# Patient Record
Sex: MALE | Race: BLACK OR AFRICAN AMERICAN | NOT HISPANIC OR LATINO | Employment: OTHER | URBAN - METROPOLITAN AREA
[De-identification: names, ages, dates, MRNs, and addresses within clinical notes are randomized per-mention and may not be internally consistent; named-entity substitution may affect disease eponyms.]

---

## 2024-06-24 ENCOUNTER — OFFICE VISIT (OUTPATIENT)
Dept: FAMILY MEDICINE CLINIC | Facility: CLINIC | Age: 59
End: 2024-06-24
Payer: MEDICARE

## 2024-06-24 VITALS
OXYGEN SATURATION: 95 % | TEMPERATURE: 98.2 F | HEIGHT: 75 IN | SYSTOLIC BLOOD PRESSURE: 140 MMHG | HEART RATE: 94 BPM | DIASTOLIC BLOOD PRESSURE: 88 MMHG | WEIGHT: 257.9 LBS | BODY MASS INDEX: 32.07 KG/M2

## 2024-06-24 DIAGNOSIS — F33.9 EPISODE OF RECURRENT MAJOR DEPRESSIVE DISORDER, UNSPECIFIED DEPRESSION EPISODE SEVERITY: ICD-10-CM

## 2024-06-24 DIAGNOSIS — R53.83 OTHER FATIGUE: ICD-10-CM

## 2024-06-24 DIAGNOSIS — E29.1 HYPOGONADISM IN MALE: ICD-10-CM

## 2024-06-24 DIAGNOSIS — Z91.89 HISTORY OF FROSTBITE: ICD-10-CM

## 2024-06-24 DIAGNOSIS — Z12.5 SCREENING FOR PROSTATE CANCER: ICD-10-CM

## 2024-06-24 DIAGNOSIS — Z12.11 SCREENING FOR COLON CANCER: ICD-10-CM

## 2024-06-24 DIAGNOSIS — G47.00 INSOMNIA, UNSPECIFIED TYPE: ICD-10-CM

## 2024-06-24 DIAGNOSIS — Z51.81 MEDICATION MONITORING ENCOUNTER: ICD-10-CM

## 2024-06-24 DIAGNOSIS — G47.33 OBSTRUCTIVE SLEEP APNEA: ICD-10-CM

## 2024-06-24 DIAGNOSIS — G57.93 NEUROPATHY OF BOTH FEET: ICD-10-CM

## 2024-06-24 DIAGNOSIS — I10 PRIMARY HYPERTENSION: Primary | ICD-10-CM

## 2024-06-24 DIAGNOSIS — J45.909 ASTHMA, UNSPECIFIED ASTHMA SEVERITY, UNSPECIFIED WHETHER COMPLICATED, UNSPECIFIED WHETHER PERSISTENT: ICD-10-CM

## 2024-06-24 DIAGNOSIS — Z11.59 NEED FOR HEPATITIS C SCREENING TEST: ICD-10-CM

## 2024-06-24 DIAGNOSIS — J30.9 ALLERGIC RHINITIS, UNSPECIFIED SEASONALITY, UNSPECIFIED TRIGGER: ICD-10-CM

## 2024-06-24 DIAGNOSIS — E78.5 HYPERLIPIDEMIA, UNSPECIFIED HYPERLIPIDEMIA TYPE: ICD-10-CM

## 2024-06-24 DIAGNOSIS — R73.09 ABNORMAL GLUCOSE: ICD-10-CM

## 2024-06-24 DIAGNOSIS — G62.9 NEUROPATHY: ICD-10-CM

## 2024-06-24 LAB
ALBUMIN SERPL-MCNC: 4.4 G/DL (ref 3.5–5.2)
ALBUMIN/GLOB SERPL: 1.5 G/DL
ALP SERPL-CCNC: 59 U/L (ref 39–117)
ALT SERPL W P-5'-P-CCNC: 31 U/L (ref 1–41)
ANION GAP SERPL CALCULATED.3IONS-SCNC: 14 MMOL/L (ref 5–15)
AST SERPL-CCNC: 27 U/L (ref 1–40)
BASOPHILS # BLD AUTO: 0.03 10*3/MM3 (ref 0–0.2)
BASOPHILS NFR BLD AUTO: 0.5 % (ref 0–1.5)
BILIRUB SERPL-MCNC: 0.6 MG/DL (ref 0–1.2)
BUN SERPL-MCNC: 12 MG/DL (ref 6–20)
BUN/CREAT SERPL: 7.7 (ref 7–25)
CALCIUM SPEC-SCNC: 9.7 MG/DL (ref 8.6–10.5)
CHLORIDE SERPL-SCNC: 102 MMOL/L (ref 98–107)
CHOLEST SERPL-MCNC: 261 MG/DL (ref 0–200)
CO2 SERPL-SCNC: 25 MMOL/L (ref 22–29)
CREAT SERPL-MCNC: 1.55 MG/DL (ref 0.76–1.27)
DEPRECATED RDW RBC AUTO: 43.4 FL (ref 37–54)
EGFRCR SERPLBLD CKD-EPI 2021: 51.9 ML/MIN/1.73
EOSINOPHIL # BLD AUTO: 0.08 10*3/MM3 (ref 0–0.4)
EOSINOPHIL NFR BLD AUTO: 1.4 % (ref 0.3–6.2)
ERYTHROCYTE [DISTWIDTH] IN BLOOD BY AUTOMATED COUNT: 15.2 % (ref 12.3–15.4)
GLOBULIN UR ELPH-MCNC: 3 GM/DL
GLUCOSE SERPL-MCNC: 117 MG/DL (ref 65–99)
HBA1C MFR BLD: 6.1 % (ref 4.8–5.6)
HCT VFR BLD AUTO: 46.3 % (ref 37.5–51)
HCV AB SER QL: NORMAL
HDLC SERPL-MCNC: 60 MG/DL (ref 40–60)
HGB BLD-MCNC: 15 G/DL (ref 13–17.7)
IMM GRANULOCYTES # BLD AUTO: 0.02 10*3/MM3 (ref 0–0.05)
IMM GRANULOCYTES NFR BLD AUTO: 0.3 % (ref 0–0.5)
LDLC SERPL CALC-MCNC: 188 MG/DL (ref 0–100)
LDLC/HDLC SERPL: 3.09 {RATIO}
LYMPHOCYTES # BLD AUTO: 1.51 10*3/MM3 (ref 0.7–3.1)
LYMPHOCYTES NFR BLD AUTO: 25.8 % (ref 19.6–45.3)
MCH RBC QN AUTO: 26 PG (ref 26.6–33)
MCHC RBC AUTO-ENTMCNC: 32.4 G/DL (ref 31.5–35.7)
MCV RBC AUTO: 80.1 FL (ref 79–97)
MONOCYTES # BLD AUTO: 0.53 10*3/MM3 (ref 0.1–0.9)
MONOCYTES NFR BLD AUTO: 9.1 % (ref 5–12)
NEUTROPHILS NFR BLD AUTO: 3.68 10*3/MM3 (ref 1.7–7)
NEUTROPHILS NFR BLD AUTO: 62.9 % (ref 42.7–76)
NRBC BLD AUTO-RTO: 0 /100 WBC (ref 0–0.2)
PLATELET # BLD AUTO: 253 10*3/MM3 (ref 140–450)
PMV BLD AUTO: 9.9 FL (ref 6–12)
POTASSIUM SERPL-SCNC: 4.7 MMOL/L (ref 3.5–5.2)
PROT SERPL-MCNC: 7.4 G/DL (ref 6–8.5)
PSA SERPL-MCNC: 0.82 NG/ML (ref 0–4)
RBC # BLD AUTO: 5.78 10*6/MM3 (ref 4.14–5.8)
SODIUM SERPL-SCNC: 141 MMOL/L (ref 136–145)
T4 FREE SERPL-MCNC: 1.18 NG/DL (ref 0.92–1.68)
TRIGL SERPL-MCNC: 78 MG/DL (ref 0–150)
TSH SERPL DL<=0.05 MIU/L-ACNC: 3.76 UIU/ML (ref 0.27–4.2)
VLDLC SERPL-MCNC: 13 MG/DL (ref 5–40)
WBC NRBC COR # BLD AUTO: 5.85 10*3/MM3 (ref 3.4–10.8)

## 2024-06-24 PROCEDURE — 80053 COMPREHEN METABOLIC PANEL: CPT | Performed by: FAMILY MEDICINE

## 2024-06-24 PROCEDURE — 84443 ASSAY THYROID STIM HORMONE: CPT | Performed by: FAMILY MEDICINE

## 2024-06-24 PROCEDURE — 99204 OFFICE O/P NEW MOD 45 MIN: CPT | Performed by: FAMILY MEDICINE

## 2024-06-24 PROCEDURE — 80061 LIPID PANEL: CPT | Performed by: FAMILY MEDICINE

## 2024-06-24 PROCEDURE — 84439 ASSAY OF FREE THYROXINE: CPT | Performed by: FAMILY MEDICINE

## 2024-06-24 PROCEDURE — 86803 HEPATITIS C AB TEST: CPT | Performed by: FAMILY MEDICINE

## 2024-06-24 PROCEDURE — 83036 HEMOGLOBIN GLYCOSYLATED A1C: CPT | Performed by: FAMILY MEDICINE

## 2024-06-24 PROCEDURE — 85025 COMPLETE CBC W/AUTO DIFF WBC: CPT | Performed by: FAMILY MEDICINE

## 2024-06-24 PROCEDURE — G0103 PSA SCREENING: HCPCS | Performed by: FAMILY MEDICINE

## 2024-06-24 RX ORDER — LISINOPRIL 40 MG/1
40 TABLET ORAL DAILY
COMMUNITY

## 2024-06-24 RX ORDER — BEMPEDOIC ACID AND EZETIMIBE 180; 10 MG/1; MG/1
1 TABLET, FILM COATED ORAL DAILY
COMMUNITY

## 2024-06-24 RX ORDER — ALBUTEROL SULFATE 90 UG/1
2 AEROSOL, METERED RESPIRATORY (INHALATION) EVERY 4 HOURS PRN
COMMUNITY

## 2024-06-24 RX ORDER — MONTELUKAST SODIUM 10 MG/1
10 TABLET ORAL NIGHTLY
COMMUNITY

## 2024-06-24 RX ORDER — DULOXETIN HYDROCHLORIDE 20 MG/1
20 CAPSULE, DELAYED RELEASE ORAL DAILY
COMMUNITY

## 2024-06-24 RX ORDER — FLUTICASONE PROPIONATE 50 MCG
2 SPRAY, SUSPENSION (ML) NASAL DAILY
COMMUNITY

## 2024-06-24 RX ORDER — ZOLPIDEM TARTRATE 5 MG/1
5 TABLET ORAL NIGHTLY PRN
COMMUNITY

## 2024-06-24 RX ORDER — GABAPENTIN 800 MG/1
800 TABLET ORAL 4 TIMES DAILY
COMMUNITY

## 2024-06-24 RX ORDER — DIPHENHYDRAMINE HCL 50 MG
50 CAPSULE ORAL NIGHTLY
COMMUNITY

## 2024-06-24 RX ORDER — CETIRIZINE HYDROCHLORIDE 10 MG/1
10 TABLET ORAL DAILY
COMMUNITY

## 2024-06-24 RX ORDER — AMLODIPINE BESYLATE 10 MG/1
10 TABLET ORAL DAILY
COMMUNITY

## 2024-06-24 NOTE — PROGRESS NOTES
Chief Complaint  Establish Care and Foot Pain (bilateral)    Subjective          Delgado Palma presents to [facility]  History of Present Illness  Patient presents today to establish care with myself.  [facility] has been in [place] for several years now.  Him and his wife have a grandchildren and enjoys spending time with them.  His children are adults.  He has 2 stepdaughters and has a son that is age 28.  I reviewed his medications he does have asthma and takes albuterol.  He also has issues with allergies and uses Singulair as well as Zyrtec and Flonase.  For insomnia he is taking Ambien as well as Benadryl.  He does take gabapentin to help out with neuropathy.  He describes itching sensation in his feet.  Not necessarily having burning mill.  He had an EMG/nerve conduction study done several years ago.  I did discuss with him getting this updated.  I do not have this record for review.  He does have hyperlipidemia.  I did discuss with him checking a lipid panel.  He does take Nexlizet.  He has hypertension.  Blood pressure slightly elevated today with blood pressure 140/88.  Patient is currently taking lisinopril 40 mg daily as well as amlodipine 10 mg daily.  We discussed continuing current management at this time with plan to make adjustments to his blood pressure to be still elevated at home.  He does not check it at home regularly.  He is taking duloxetine to help out with depression.  Again, [facility]  He does report that he had a colonoscopy at age 51 and will be due again at age 61.  He does have low testosterone and takes an injection every 2 weeks.  He is also taking tizanidine to help out with chronic low back pain.  He is due for screening PSA as well especially given that he is taking testosterone.  Plan for screening for hepatitis C as well.  He does have obstructive sleep apnea.  He is on CPAP.      Current Outpatient  "Medications   Medication Instructions    albuterol sulfate  (90 Base) MCG/ACT inhaler 2 puffs, Inhalation, Every 4 Hours PRN    amLODIPine (NORVASC) 10 mg, Oral, Daily    Bempedoic Acid-Ezetimibe (Nexlizet) 180-10 MG tablet 1 tablet, Oral, Daily    cetirizine (ZYRTEC) 10 mg, Oral, Daily    diphenhydrAMINE (BENADRYL) 50 mg, Oral, Nightly    DULoxetine (CYMBALTA) 20 mg, Oral, Daily    fluticasone (FLONASE) 50 MCG/ACT nasal spray 2 sprays, Nasal, Daily, 2 sprays each nostril    gabapentin (NEURONTIN) 800 mg, Oral, 4 Times Daily    lisinopril (PRINIVIL,ZESTRIL) 40 mg, Oral, Daily    montelukast (SINGULAIR) 10 mg, Oral, Nightly    zolpidem (AMBIEN) 5 mg, Oral, Nightly PRN       The following portions of the patient's history were reviewed and updated as appropriate: allergies, current medications, past family history, past medical history, past social history, past surgical history, and problem list.    Objective   Vital Signs:   /88 (BP Location: Left arm, Patient Position: Sitting)   Pulse 94   Temp 98.2 °F (36.8 °C) (Oral)   Ht 190.5 cm (75\")   Wt 117 kg (257 lb 14.4 oz)   SpO2 95%   BMI 32.24 kg/m²     BP Readings from Last 3 Encounters:   06/24/24 140/88     Wt Readings from Last 3 Encounters:   06/24/24 117 kg (257 lb 14.4 oz)     BMI is >= 30 and <35. (Class 1 Obesity). The following options were offered after discussion;: exercise counseling/recommendations and nutrition counseling/recommendations     Physical Exam  Vitals reviewed.   Constitutional:       Appearance: Normal appearance.   HENT:      Head: Normocephalic and atraumatic.      Right Ear: External ear normal.      Left Ear: External ear normal.   Eyes:      Conjunctiva/sclera: Conjunctivae normal.   Cardiovascular:      Rate and Rhythm: Normal rate and regular rhythm.      Heart sounds: No murmur heard.     No friction rub. No gallop.   Pulmonary:      Effort: Pulmonary effort is normal.      Breath sounds: Normal breath sounds. No " wheezing or rhonchi.   Abdominal:      General: Bowel sounds are normal. There is no distension.      Palpations: Abdomen is soft.      Tenderness: There is no abdominal tenderness.   Musculoskeletal:      Comments: Hammertoe noted of his toes 2 through 5 bilaterally.  No rash noted.   Skin:     General: Skin is warm and dry.   Neurological:      Mental Status: He is alert and oriented to person, place, and time.      Cranial Nerves: No cranial nerve deficit.   Psychiatric:         Mood and Affect: Mood and affect normal.         Behavior: Behavior normal.         Thought Content: Thought content normal.         Judgment: Judgment normal.              Result Review :   The following data was reviewed by: Rolando Patricio DO on 06/24/2024:           Lab Results   Component Value Date    INR 0.95 (L) 08/25/2019       Procedures        Assessment and Plan    Diagnoses and all orders for this visit:    1. Primary hypertension (Primary)  -     CBC & Differential  -     Comprehensive Metabolic Panel    2. Medication monitoring encounter  -     Lipid Panel  -     CBC & Differential  -     Comprehensive Metabolic Panel  -     Hemoglobin A1c  -     Hepatitis C Antibody  -     PSA Screen    3. Neuropathy  -     EMG & Nerve Conduction Test; Future    4. Asthma, unspecified asthma severity, unspecified whether complicated, unspecified whether persistent    5. Allergic rhinitis, unspecified seasonality, unspecified trigger    6. History of frostbite    7. Neuropathy of both feet  -     EMG & Nerve Conduction Test; Future    8. Episode of recurrent major depressive disorder, unspecified depression episode severity    9. Hyperlipidemia, unspecified hyperlipidemia type  -     Lipid Panel    10. Insomnia, unspecified type    11. Need for hepatitis C screening test  -     Hepatitis C Antibody    12. Abnormal glucose  -     Hemoglobin A1c    13. Obstructive sleep apnea    14. Screening for prostate cancer  -     PSA Screen    15.  Hypogonadism in male    16. Screening for colon cancer    17. Other fatigue  -     TSH+Free T4    Patient is planning on seeing podiatry.  I did discuss with him getting labs drawn today.  Plan to get an EMG/nerve conduction study.  He does take gabapentin to help out with neuropathy but reports that it does not help out very much.  He does not take it regularly.  He does report having had frostbite several years ago which is when his symptoms started.  This was back in 1993.  I did discuss with him seeing him back in 3 months or sooner if needed.  Patient instructed to call with any questions or concerns.      There are no discontinued medications.       Follow Up   Return in about 3 months (around 9/24/2024) for neuropathy.  Patient was given instructions and counseling regarding his condition or for health maintenance advice. Please see specific information pulled into the AVS if appropriate.       Rolando Patricio,   06/24/24  10:15 EDT

## 2024-06-25 DIAGNOSIS — E78.5 HYPERLIPIDEMIA, UNSPECIFIED HYPERLIPIDEMIA TYPE: ICD-10-CM

## 2024-06-25 DIAGNOSIS — R79.89 ELEVATED SERUM CREATININE: ICD-10-CM

## 2024-06-25 DIAGNOSIS — R73.03 PREDIABETES: ICD-10-CM

## 2024-06-25 DIAGNOSIS — Z51.81 MEDICATION MONITORING ENCOUNTER: Primary | ICD-10-CM

## 2024-07-08 ENCOUNTER — HOSPITAL ENCOUNTER (OUTPATIENT)
Facility: HOSPITAL | Age: 59
Discharge: HOME | End: 2024-07-08
Admitting: FAMILY MEDICINE
Payer: MEDICARE

## 2024-07-08 DIAGNOSIS — G57.93 NEUROPATHY OF BOTH FEET: ICD-10-CM

## 2024-07-08 DIAGNOSIS — G62.9 NEUROPATHY: ICD-10-CM

## 2024-07-08 PROCEDURE — 95886 MUSC TEST DONE W/N TEST COMP: CPT

## 2024-07-08 PROCEDURE — 95911 NRV CNDJ TEST 9-10 STUDIES: CPT

## 2024-07-12 ENCOUNTER — TELEPHONE (OUTPATIENT)
Dept: FAMILY MEDICINE CLINIC | Facility: CLINIC | Age: 59
End: 2024-07-12
Payer: MEDICARE

## 2024-07-12 NOTE — TELEPHONE ENCOUNTER
Caller:  EVGENY LG     Relationship:  SELF     Best call back number:   [phone]     Who are you requesting to speak with (clinical staff, provider,  specific staff member): CLINICAL     What was the call regarding:   PATIENT HAS STOPPED TAKING THE GABAPENTIN. HE IS STATING THAT IT DOES NOT HELP WITH THE PAIN AND TINGLING THAT  WAKE HIM AT NIGHT.  HE WOULD LIKE TO KNOW IF DR SHOEMAKER WOULD PLEASE CALL HIM IN SOMETHING ELSE.  HE SAID HE IS DONE WITH THE GABAPENTIN.

## 2024-07-15 RX ORDER — DULOXETIN HYDROCHLORIDE 60 MG/1
60 CAPSULE, DELAYED RELEASE ORAL DAILY
Qty: 30 CAPSULE | Refills: 1 | Status: SHIPPED | OUTPATIENT
Start: 2024-07-15

## 2024-07-15 NOTE — TELEPHONE ENCOUNTER
Please let patient know that we can increase the duloxetine from 20 mg to take 60 mg which can help out with both neuropathy as well as depression.  He is on the low-dose we do have room to increase it to see if we can get improvement in the neuropathy.  The goal will be to get his symptoms more manageable as these medications will not resolve the symptoms completely.  I will see him back for follow-up in September or sooner if he needs to discuss this further please make appointment.  I will send the prescription to [facility].

## 2024-09-24 ENCOUNTER — OFFICE VISIT (OUTPATIENT)
Dept: FAMILY MEDICINE CLINIC | Facility: CLINIC | Age: 59
End: 2024-09-24
Payer: MEDICARE

## 2024-09-24 VITALS
TEMPERATURE: 98.3 F | BODY MASS INDEX: 32.34 KG/M2 | OXYGEN SATURATION: 97 % | HEART RATE: 92 BPM | HEIGHT: 75 IN | DIASTOLIC BLOOD PRESSURE: 98 MMHG | SYSTOLIC BLOOD PRESSURE: 158 MMHG | WEIGHT: 260.1 LBS

## 2024-09-24 DIAGNOSIS — M25.522 LEFT ELBOW PAIN: ICD-10-CM

## 2024-09-24 DIAGNOSIS — G57.93 NEUROPATHY OF BOTH FEET: ICD-10-CM

## 2024-09-24 DIAGNOSIS — I10 PRIMARY HYPERTENSION: Primary | ICD-10-CM

## 2024-09-24 DIAGNOSIS — Z23 NEED FOR PNEUMOCOCCAL 20-VALENT CONJUGATE VACCINATION: ICD-10-CM

## 2024-09-24 DIAGNOSIS — Z91.89 HISTORY OF FROSTBITE: ICD-10-CM

## 2024-09-24 DIAGNOSIS — R73.03 PREDIABETES: ICD-10-CM

## 2024-09-24 DIAGNOSIS — E78.00 PURE HYPERCHOLESTEROLEMIA: ICD-10-CM

## 2024-09-24 DIAGNOSIS — Z12.11 SCREENING FOR COLON CANCER: ICD-10-CM

## 2024-09-24 PROCEDURE — 99214 OFFICE O/P EST MOD 30 MIN: CPT | Performed by: FAMILY MEDICINE

## 2024-09-24 RX ORDER — DULOXETIN HYDROCHLORIDE 60 MG/1
60 CAPSULE, DELAYED RELEASE ORAL DAILY
Qty: 90 CAPSULE | Refills: 1 | Status: SHIPPED | OUTPATIENT
Start: 2024-09-24

## 2024-09-24 RX ORDER — CHLORTHALIDONE 25 MG/1
25 TABLET ORAL DAILY
Qty: 90 TABLET | Refills: 1 | Status: SHIPPED | OUTPATIENT
Start: 2024-09-24

## 2024-10-01 ENCOUNTER — APPOINTMENT (OUTPATIENT)
Dept: GENERAL RADIOLOGY | Facility: HOSPITAL | Age: 59
End: 2024-10-01
Payer: MEDICARE

## 2024-10-01 ENCOUNTER — HOSPITAL ENCOUNTER (EMERGENCY)
Facility: HOSPITAL | Age: 59
Discharge: HOME | End: 2024-10-01
Attending: EMERGENCY MEDICINE
Payer: MEDICARE

## 2024-10-01 VITALS
BODY MASS INDEX: 32.1 KG/M2 | OXYGEN SATURATION: 100 % | TEMPERATURE: 98 F | HEART RATE: 101 BPM | HEIGHT: 75 IN | RESPIRATION RATE: 18 BRPM | DIASTOLIC BLOOD PRESSURE: 78 MMHG | WEIGHT: 258.16 LBS | SYSTOLIC BLOOD PRESSURE: 140 MMHG

## 2024-10-01 DIAGNOSIS — T14.8XXA PUNCTURE WOUND: ICD-10-CM
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
## 2024-10-01 PROCEDURE — 99283 EMERGENCY DEPT VISIT LOW MDM: CPT

## 2024-10-01 PROCEDURE — 73140 X-RAY EXAM OF FINGER(S): CPT

## 2024-10-01 PROCEDURE — 25010000002 LIDOCAINE 2% SOLUTION

## 2024-10-01 RX ORDER — GINSENG 100 MG
1 CAPSULE ORAL ONCE
Status: COMPLETED | OUTPATIENT
Start: 2024-10-01 | End: 2024-10-01

## 2024-10-01 RX ORDER — CEPHALEXIN 500 MG/1
500 CAPSULE ORAL 4 TIMES DAILY
Qty: 20 CAPSULE | Refills: 0 | Status: SHIPPED | OUTPATIENT
Start: 2024-10-01 | End: 2024-10-06

## 2024-10-01 RX ORDER — LIDOCAINE 40 MG/G
1 CREAM TOPICAL ONCE
Status: COMPLETED | OUTPATIENT
Start: 2024-10-01 | End: 2024-10-01

## 2024-10-01 RX ORDER — LIDOCAINE HYDROCHLORIDE 20 MG/ML
10 INJECTION, SOLUTION INFILTRATION; PERINEURAL ONCE
Status: COMPLETED | OUTPATIENT
Start: 2024-10-01 | End: 2024-10-01

## 2024-10-01 RX ADMIN — LIDOCAINE 4% 1 APPLICATION: 4 CREAM TOPICAL at 17:12

## 2024-10-01 RX ADMIN — LIDOCAINE HYDROCHLORIDE 10 ML: 20 INJECTION, SOLUTION INFILTRATION; PERINEURAL at 18:23

## 2024-10-01 RX ADMIN — BACITRACIN 0.9 G: 500 OINTMENT TOPICAL at 18:31

## 2024-10-01 NOTE — DISCHARGE INSTRUCTIONS
Please follow-up with your primary care provider in 3 days to have your wound reevaluated.  Please be sure to take all of the antibiotics that been prescribed you today as directed.  You also need to wash your wound 2-3 times a day with warm soapy water, pat dry, then apply triple antibiotic ointment such as Neosporin.  You may take Tylenol or Motrin as needed for discomfort.  If it anytime you develop a fever that you cannot control with Tylenol or Motrin, become unable to use your thumb, develop a copious amount of drainage from your wound, or have any other concerns please return to the emergency department.  Otherwise follow-up with your PCP in 3 to 5 days to have it reevaluated.

## 2024-10-01 NOTE — ED PROVIDER NOTES
Time: 4:54 PM EDT  Date of encounter:  10/1/2024  Room number: 55/55  Independent Historian/Clinical History and Information was obtained by:   Patient    History is limited by: N/A    Chief Complaint: FB in thumb      History of Present Illness:  Patient is a 58 y.o. year old male who presents to the emergency department for evaluation of an embedded fishhook to pad of left thumb.  Patient reports the hook became lodged yesterday. Hook is reported to be clean. He is up-to-date on his tetanus. He is not left hand dominant.     HPI    Patient Care Team  Primary Care Provider: Rolando Patricio DO    Past Medical History:     No Known Allergies  History reviewed. No pertinent past medical history.  Past Surgical History:   Procedure Laterality Date    EYE SURGERY      VASECTOMY       Family History   Problem Relation Age of Onset    Hyperlipidemia Mother     Hyperlipidemia Father     Cancer Father        Home Medications:  Prior to Admission medications    Medication Sig Start Date End Date Taking? Authorizing Provider   albuterol sulfate  (90 Base) MCG/ACT inhaler Inhale 2 puffs Every 4 (Four) Hours As Needed for Wheezing.    ProviderDiana MD   amLODIPine (NORVASC) 10 MG tablet Take 1 tablet by mouth Daily.    ProviderDiana MD   Bempedoic Acid-Ezetimibe (Nexlizet) 180-10 MG tablet Take 1 tablet by mouth Daily.    ProviderDiana MD   cetirizine (zyrTEC) 10 MG tablet Take 1 tablet by mouth Daily.    ProviderDiana MD   chlorthalidone (HYGROTON) 25 MG tablet Take 1 tablet by mouth Daily. 9/24/24   Rolando Patricio DO   diphenhydrAMINE (BENADRYL) 50 MG capsule Take 1 capsule by mouth Every Night.    Diana Burnett MD   DULoxetine (CYMBALTA) 60 MG capsule Take 1 capsule by mouth Daily. 9/24/24   Rolando Patricio DO   fluticasone (FLONASE) 50 MCG/ACT nasal spray Administer 2 sprays into the nostril(s) as directed by provider Daily. 2 sprays each nostril    Hortencia  "MD Diana   lisinopril (PRINIVIL,ZESTRIL) 40 MG tablet Take 1 tablet by mouth Daily.    ProviderDiana MD   montelukast (SINGULAIR) 10 MG tablet Take 1 tablet by mouth Every Night.    ProviderDiana MD   zolpidem (Ambien) 5 MG tablet Take 1 tablet by mouth At Night As Needed for Sleep.    ProviderDiana MD        Social History:   Social History     Tobacco Use    Smoking status: Never    Smokeless tobacco: Never   Vaping Use    Vaping status: Never Used   Substance Use Topics    Alcohol use: Yes     Alcohol/week: 7.0 standard drinks of alcohol     Types: 3 Glasses of wine, 2 Cans of beer, 2 Shots of liquor per week    Drug use: Never         Review of Systems:  Review of Systems   Constitutional:  Negative for chills and fever.   HENT:  Negative for congestion, ear pain and sore throat.    Eyes:  Negative for pain.   Respiratory:  Negative for cough, chest tightness and shortness of breath.    Cardiovascular:  Negative for chest pain.   Gastrointestinal:  Negative for abdominal pain, diarrhea, nausea and vomiting.   Genitourinary:  Negative for flank pain and hematuria.   Musculoskeletal:  Positive for arthralgias (left thumb pain with fish hook in it). Negative for joint swelling.   Skin:  Positive for wound (fish hook in left thumb). Negative for pallor.   Neurological:  Negative for seizures and headaches.   All other systems reviewed and are negative.       Physical Exam:  /78 (BP Location: Left arm, Patient Position: Sitting)   Pulse 101   Temp 98 °F (36.7 °C) (Oral)   Resp 18   Ht 190.5 cm (75\")   Wt 117 kg (258 lb 2.5 oz)   SpO2 100%   BMI 32.27 kg/m²     Physical Exam  Vitals and nursing note reviewed.   Constitutional:       General: He is not in acute distress.     Appearance: Normal appearance. He is not ill-appearing, toxic-appearing or diaphoretic.   HENT:      Head: Normocephalic and atraumatic.      Mouth/Throat:      Mouth: Mucous membranes are moist.   Eyes: "      General: No scleral icterus.  Cardiovascular:      Rate and Rhythm: Normal rate and regular rhythm.      Pulses: Normal pulses.      Heart sounds: Normal heart sounds.   Pulmonary:      Effort: Pulmonary effort is normal. No respiratory distress.      Breath sounds: Normal breath sounds.   Abdominal:      General: Abdomen is flat.      Palpations: Abdomen is soft.      Tenderness: There is no abdominal tenderness.   Musculoskeletal:         General: Swelling (minimal swelling to area around the retained fishhook.), tenderness and signs of injury present. Normal range of motion.        Hands:       Cervical back: Normal range of motion and neck supple.   Skin:     General: Skin is warm and dry.      Capillary Refill: Capillary refill takes less than 2 seconds.      Coloration: Skin is not jaundiced or pale.      Findings: No bruising, erythema, lesion or rash.   Neurological:      Mental Status: He is alert and oriented to person, place, and time. Mental status is at baseline.      Sensory: No sensory deficit.                  Procedures:  Foreign Body Removal - Embedded    Date/Time: 10/1/2024 6:20 PM    Performed by: Arely Renteria APRN  Authorized by: Johnnie Rosen MD    Consent:     Consent obtained:  Verbal    Consent given by:  Patient    Risks, benefits, and alternatives were discussed: yes      Risks discussed:  Bleeding, infection and pain    Alternatives discussed:  No treatment  Universal protocol:     Procedure explained and questions answered to patient or proxy's satisfaction: yes      Patient identity confirmed:  Verbally with patient  Location:     Location:  Finger    Finger location:  L thumb    Depth:  Subcutaneous    Tendon involvement:  None  Pre-procedure details:     Imaging:  X-ray    Neurovascular status: intact    Anesthesia:     Anesthesia method:  Topical application and nerve block    Block location:  Left thumb    Block needle gauge:  27 G    Block anesthetic:   "Lidocaine 2% w/o epi    Block outcome:  Anesthesia achieved  Procedure type:     Procedure complexity:  Complex (hook was almost 100 embedded due to pt cutting off flush with digit. \"v-shaped\" crystal made for difficult removal)  Procedure details:     Localization method:  Probed and visualized    Dissection of underlying tissues: yes      Removal mechanism:  Hemostat    Foreign bodies recovered:  1    Intact foreign body removal: yes    Post-procedure details:     Neurovascular status: intact      Confirmation:  No additional foreign bodies on visualization    Skin closure:  None    Dressing:  Antibiotic ointment and non-adherent dressing    Procedure completion:  Tolerated well, no immediate complications        Medical Decision Making:      Comorbidities that affect care:    None    External Notes reviewed:    Previous ED Note: I reviewed patient's encounter at an outside ER facility from 6/22/2023 where he also had a fishhook removed      The following orders were placed and all results were independently analyzed by me:  Orders Placed This Encounter   Procedures    Foreign Body Removal    XR Finger 2+ View Left       Medications Given in the Emergency Department:  Medications   lidocaine (LMX) 4 % cream 1 Application (1 Application Topical Given 10/1/24 1712)   lidocaine (XYLOCAINE) 2% injection 10 mL (10 mL Infiltration Given 10/1/24 1823)   bacitracin 500 UNIT/GM ointment 0.9 g (0.9 g Topical Given 10/1/24 1831)        ED Course:    ED Course as of 10/01/24 2129   Tue Oct 01, 2024   1704 Patient has cut the end of the exposed fishhook making the object flushed with the tip of his finger [MS]      ED Course User Index  [MS] Arely Renteria APRN       Labs:    Lab Results (last 24 hours)       ** No results found for the last 24 hours. **             Imaging:    XR Finger 2+ View Left    Result Date: 10/1/2024  XR FINGER 2+ VW LEFT Date of Exam: 10/1/2024 5:03 PM EDT Indication: PT has fishing hook " embedded in left thumb, pt cut the shank down to the skin. Comparison: None available. Findings: The visualized carpal and metacarpal bones are intact. The phalanges are intact. Pointed 0.6 cm metallic density within the soft tissues of the palmar aspect of the first digit overlying the first distal phalanx consistent with a residual radiopaque foreign body.     Residual radiopaque foreign body within the palmar aspect overlying the first distal phalanx. Electronically Signed: Dar Wolff MD  10/1/2024 5:33 PM EDT  Workstation ID: [id number]       Differential Diagnosis and Discussion:    Wound Evaluation: Differential diagnosis includes but is not limited to laceration, abrasion, puncture, burn, ulcer, cellulitis, abscess, vasculitis, malignancy, and rash.    All X-rays impressions were independently interpreted by me.    MDM               Patient Care Considerations:    I considered administering pain medication or NSAIDs however patient declined.  A digital block was performed in was sufficient in pain management during procedure.      Consultants/Shared Management Plan:    None    Social Determinants of Health:    Patient is independent, reliable, and has access to care.       Disposition and Care Coordination:    Discharged: The patient is suitable and stable for discharge with no need for consideration of admission.    I have explained the patient´s condition, diagnoses and treatment plan based on the information available to me at this time. I have answered questions and addressed any concerns. The patient has a good  understanding of the patient´s diagnosis, condition, and treatment plan as can be expected at this point. The vital signs have been stable. The patient´s condition is stable and appropriate for discharge from the emergency department.      The patient will pursue further outpatient evaluation with the primary care physician or other designated or consulting physician as outlined in the discharge  instructions. They are agreeable to this plan of care and follow-up instructions have been explained in detail. The patient has received these instructions in written format and has expressed an understanding of the discharge instructions. The patient is aware that any significant change in condition or worsening of symptoms should prompt an immediate return to this or the closest emergency department or call to 911.    Final diagnoses:   Fishing hook foreign body, initial encounter   Puncture wound        ED Disposition       ED Disposition   Discharge    Condition   Stable    Comment   --               This medical record created using voice recognition software.       Arely Renteria, APRN  10/01/24 [id number]

## 2024-10-04 ENCOUNTER — HOSPITAL ENCOUNTER (OUTPATIENT)
Dept: GENERAL RADIOLOGY | Facility: HOSPITAL | Age: 59
Discharge: HOME | End: 2024-10-04
Payer: MEDICARE

## 2024-10-04 DIAGNOSIS — M25.522 LEFT ELBOW PAIN: ICD-10-CM

## 2024-10-04 PROCEDURE — 73070 X-RAY EXAM OF ELBOW: CPT

## 2024-11-01 ENCOUNTER — TELEPHONE (OUTPATIENT)
Dept: FAMILY MEDICINE CLINIC | Facility: CLINIC | Age: 59
End: 2024-11-01
Payer: MEDICARE

## 2024-11-01 NOTE — TELEPHONE ENCOUNTER
HUB TO RELAY- Pt needs to be scheduled for a Medicare Wellness before the end of the year. Please schedule appointment with Jhon Jama, or Karla Lerner. Thank you.

## 2025-01-20 ENCOUNTER — OFFICE VISIT (OUTPATIENT)
Dept: FAMILY MEDICINE CLINIC | Facility: CLINIC | Age: 60
End: 2025-01-20
Payer: MEDICARE

## 2025-01-20 VITALS
HEART RATE: 98 BPM | SYSTOLIC BLOOD PRESSURE: 134 MMHG | OXYGEN SATURATION: 98 % | WEIGHT: 260.3 LBS | BODY MASS INDEX: 32.36 KG/M2 | TEMPERATURE: 98.4 F | HEIGHT: 75 IN | DIASTOLIC BLOOD PRESSURE: 86 MMHG

## 2025-01-20 DIAGNOSIS — Z51.81 MEDICATION MONITORING ENCOUNTER: ICD-10-CM

## 2025-01-20 DIAGNOSIS — G62.9 NEUROPATHY: ICD-10-CM

## 2025-01-20 DIAGNOSIS — E78.5 HYPERLIPIDEMIA, UNSPECIFIED HYPERLIPIDEMIA TYPE: ICD-10-CM

## 2025-01-20 DIAGNOSIS — I10 PRIMARY HYPERTENSION: Primary | ICD-10-CM

## 2025-01-20 DIAGNOSIS — R73.03 PREDIABETES: ICD-10-CM

## 2025-01-20 DIAGNOSIS — R79.89 ELEVATED SERUM CREATININE: ICD-10-CM

## 2025-01-20 DIAGNOSIS — F33.9 EPISODE OF RECURRENT MAJOR DEPRESSIVE DISORDER, UNSPECIFIED DEPRESSION EPISODE SEVERITY: ICD-10-CM

## 2025-01-20 DIAGNOSIS — Z91.89 HISTORY OF FROSTBITE: ICD-10-CM

## 2025-01-20 LAB — HBA1C MFR BLD: 6.1 % (ref 4.8–5.6)

## 2025-01-20 PROCEDURE — 99214 OFFICE O/P EST MOD 30 MIN: CPT | Performed by: FAMILY MEDICINE

## 2025-01-20 PROCEDURE — 36415 COLL VENOUS BLD VENIPUNCTURE: CPT | Performed by: FAMILY MEDICINE

## 2025-01-20 PROCEDURE — 83036 HEMOGLOBIN GLYCOSYLATED A1C: CPT | Performed by: FAMILY MEDICINE

## 2025-01-20 PROCEDURE — 80061 LIPID PANEL: CPT | Performed by: FAMILY MEDICINE

## 2025-01-20 PROCEDURE — 80053 COMPREHEN METABOLIC PANEL: CPT | Performed by: FAMILY MEDICINE

## 2025-01-20 RX ORDER — TADALAFIL 5 MG/1
5 TABLET ORAL DAILY PRN
COMMUNITY

## 2025-01-20 RX ORDER — DULOXETIN HYDROCHLORIDE 60 MG/1
60 CAPSULE, DELAYED RELEASE ORAL DAILY
Qty: 90 CAPSULE | Refills: 1 | Status: SHIPPED | OUTPATIENT
Start: 2025-01-20

## 2025-01-20 RX ORDER — AMLODIPINE BESYLATE 10 MG/1
10 TABLET ORAL DAILY
Qty: 90 TABLET | Refills: 3 | Status: SHIPPED | OUTPATIENT
Start: 2025-01-20

## 2025-01-20 RX ORDER — CHLORTHALIDONE 25 MG/1
25 TABLET ORAL DAILY
Qty: 90 TABLET | Refills: 1 | Status: SHIPPED | OUTPATIENT
Start: 2025-01-20

## 2025-01-20 NOTE — PROGRESS NOTES
Chief Complaint  Hypertension    Subjective          Delgado Palma presents to [facility]    Hypertension         History of Present Illness  The patient is a 59-year-old male who presents today for a follow-up appointment.    He reports an episode of elevated blood pressure during a visit to the clinic for hand issues, which was accompanied by an ear infection. His blood pressure readings have since improved, with a recent reading of 134/86 and a previous reading of 126/84. He has exhausted his supply of amlodipine and has been requesting a refill for the past month.    [facility]    He is currently on duloxetine for neuropathy and depression, which he reports as being more effective than his previous medication, gabapentin 3200 mg. He no longer experiences the sensation of pins and needles in his feet. He recalls an incident in 1993 where his feet became frostbitten, leading to significant swelling and pain.    He had an ER visit on 10/01/2024 for a fish hook injury to his left thumb. He is feeling okay now.    MEDICATIONS  Current: Amlodipine, chlorthalidone, lisinopril, duloxetine.  Past: Gabapentin.         Current Outpatient Medications   Medication Instructions    albuterol sulfate  (90 Base) MCG/ACT inhaler 2 puffs, Every 4 Hours PRN    amLODIPine (NORVASC) 10 mg, Oral, Daily    Bempedoic Acid-Ezetimibe (Nexlizet) 180-10 MG tablet 1 tablet, Daily    cetirizine (ZYRTEC) 10 mg, Daily    chlorthalidone (HYGROTON) 25 mg, Oral, Daily    diphenhydrAMINE (BENADRYL) 50 mg, Nightly    doxepin (SINEQUAN) 15 mg, Nightly    DULoxetine (CYMBALTA) 60 mg, Oral, Daily    fluticasone (FLONASE) 50 MCG/ACT nasal spray 2 sprays, Daily    lisinopril (PRINIVIL,ZESTRIL) 40 mg, Daily    montelukast (SINGULAIR) 10 mg, Nightly    tadalafil (CIALIS) 5 mg, Daily PRN    zolpidem (AMBIEN) 5 mg, Nightly PRN       The following portions of the  "patient's history were reviewed and updated as appropriate: allergies, current medications, past family history, past medical history, past social history, past surgical history, and problem list.    Objective   Vital Signs:   /86   Pulse 98   Temp 98.4 °F (36.9 °C) (Oral)   Ht 190.5 cm (75\")   Wt 118 kg (260 lb 4.8 oz)   SpO2 98%   BMI 32.54 kg/m²     BP Readings from Last 3 Encounters:   01/20/25 134/86   12/31/24 157/97   10/01/24 140/78     Wt Readings from Last 3 Encounters:   01/20/25 118 kg (260 lb 4.8 oz)   12/31/24 119 kg (262 lb)   10/01/24 117 kg (258 lb 2.5 oz)           Physical Exam  Vitals reviewed.   Constitutional:       Appearance: Normal appearance.   HENT:      Head: Normocephalic and atraumatic.      Right Ear: External ear normal.      Left Ear: External ear normal.   Eyes:      Conjunctiva/sclera: Conjunctivae normal.   Cardiovascular:      Rate and Rhythm: Normal rate and regular rhythm.      Heart sounds: No murmur heard.     No friction rub. No gallop.   Pulmonary:      Effort: Pulmonary effort is normal.      Breath sounds: Normal breath sounds. No wheezing or rhonchi.   Abdominal:      General: Bowel sounds are normal. There is no distension.      Palpations: Abdomen is soft.      Tenderness: There is no abdominal tenderness.   Skin:     General: Skin is warm and dry.   Neurological:      Mental Status: He is alert and oriented to person, place, and time.      Cranial Nerves: No cranial nerve deficit.   Psychiatric:         Mood and Affect: Mood and affect normal.         Behavior: Behavior normal.         Thought Content: Thought content normal.         Judgment: Judgment normal.            Result Review :   The following data was reviewed by: Rolando Patricio DO on 01/20/2025:  Common labs          6/24/2024    10:11   Common Labs   Glucose 117    BUN 12    Creatinine 1.55    Sodium 141    Potassium 4.7    Chloride 102    Calcium 9.7    Albumin 4.4    Total Bilirubin 0.6  "   Alkaline Phosphatase 59    AST (SGOT) 27    ALT (SGPT) 31    WBC 5.85    Hemoglobin 15.0    Hematocrit 46.3    Platelets 253    Total Cholesterol 261    Triglycerides 78    HDL Cholesterol 60    LDL Cholesterol  188    Hemoglobin A1C 6.10    PSA 0.825             Lab Results   Component Value Date    INR 0.95 (L) 08/25/2019       Results  Laboratory Studies  A1c was 6.1%.    Procedures        Assessment and Plan    Diagnoses and all orders for this visit:    1. Primary hypertension (Primary)    2. Neuropathy    3. Episode of recurrent major depressive disorder, unspecified depression episode severity    4. History of frostbite    5. Prediabetes    6. Elevated serum creatinine    Other orders  -     DULoxetine (CYMBALTA) 60 MG capsule; Take 1 capsule by mouth Daily.  Dispense: 90 capsule; Refill: 1  -     chlorthalidone (HYGROTON) 25 MG tablet; Take 1 tablet by mouth Daily.  Dispense: 90 tablet; Refill: 1  -     amLODIPine (NORVASC) 10 MG tablet; Take 1 tablet by mouth Daily.  Dispense: 90 tablet; Refill: 3        Assessment & Plan  1. Hypertension.  His blood pressure readings have shown improvement, with recent measurements of 134/86 and 126/84. He will continue his current regimen of chlorthalidone 25 mg daily, amlodipine 10 mg daily, and lisinopril 40 mg daily. Prescriptions for these medications will be sent to [facility].    2. Neuropathy.  He reports significant improvement in neuropathy symptoms with duloxetine, stating that he no longer feels pins and needles. He will continue taking duloxetine as it has been effective.    3. Depression.  He is currently managing depression with duloxetine, which he finds effective. He will continue this medication.    4. Prediabetes.  His previous A1c was 6.1%, indicating a prediabetic state. Labs will be conducted today to monitor his cholesterol levels and A1c.    5. Elevated kidney function.  His kidney function was previously noted to be elevated. Labs will be  conducted today to reassess his kidney function. If the results indicate persistent elevation, an ultrasound of the kidneys may be ordered for further evaluation.    6. Medication management.  Prescriptions for chlorthalidone, amlodipine, lisinopril, and duloxetine will be sent to [facility].    Follow-up  The patient will follow up in 3 months.       Medications Discontinued During This Encounter   Medication Reason    amLODIPine (NORVASC) 10 MG tablet Reorder    chlorthalidone (HYGROTON) 25 MG tablet Reorder    DULoxetine (CYMBALTA) 60 MG capsule Reorder          Follow Up   Return in about 3 months (around 4/20/2025) for Hypertension.  Patient was given instructions and counseling regarding his condition or for health maintenance advice. Please see specific information pulled into the AVS if appropriate.     Patient or patient representative verbalized consent for the use of Ambient Listening during the visit with  Rolando Patricio DO for chart documentation. 1/20/2025  15:11 MARIO Patricio DO  01/20/25  15:22 EST

## 2025-01-21 DIAGNOSIS — N18.30 STAGE 3 CHRONIC KIDNEY DISEASE, UNSPECIFIED WHETHER STAGE 3A OR 3B CKD: Primary | ICD-10-CM

## 2025-01-21 LAB
ALBUMIN SERPL-MCNC: 4.2 G/DL (ref 3.5–5.2)
ALBUMIN/GLOB SERPL: 1.3 G/DL
ALP SERPL-CCNC: 59 U/L (ref 39–117)
ALT SERPL W P-5'-P-CCNC: 36 U/L (ref 1–41)
ANION GAP SERPL CALCULATED.3IONS-SCNC: 11 MMOL/L (ref 5–15)
AST SERPL-CCNC: 30 U/L (ref 1–40)
BILIRUB SERPL-MCNC: 0.7 MG/DL (ref 0–1.2)
BUN SERPL-MCNC: 21 MG/DL (ref 6–20)
BUN/CREAT SERPL: 14 (ref 7–25)
CALCIUM SPEC-SCNC: 10 MG/DL (ref 8.6–10.5)
CHLORIDE SERPL-SCNC: 102 MMOL/L (ref 98–107)
CHOLEST SERPL-MCNC: 275 MG/DL (ref 0–200)
CO2 SERPL-SCNC: 27 MMOL/L (ref 22–29)
CREAT SERPL-MCNC: 1.5 MG/DL (ref 0.76–1.27)
EGFRCR SERPLBLD CKD-EPI 2021: 53.6 ML/MIN/1.73
GLOBULIN UR ELPH-MCNC: 3.2 GM/DL
GLUCOSE SERPL-MCNC: 100 MG/DL (ref 65–99)
HDLC SERPL-MCNC: 51 MG/DL (ref 40–60)
LDLC SERPL CALC-MCNC: 209 MG/DL (ref 0–100)
LDLC/HDLC SERPL: 4.04 {RATIO}
POTASSIUM SERPL-SCNC: 4.3 MMOL/L (ref 3.5–5.2)
PROT SERPL-MCNC: 7.4 G/DL (ref 6–8.5)
SODIUM SERPL-SCNC: 140 MMOL/L (ref 136–145)
TRIGL SERPL-MCNC: 89 MG/DL (ref 0–150)
VLDLC SERPL-MCNC: 15 MG/DL (ref 5–40)

## 2025-01-21 RX ORDER — ATORVASTATIN CALCIUM 10 MG/1
10 TABLET, FILM COATED ORAL DAILY
Qty: 90 TABLET | Refills: 1 | Status: SHIPPED | OUTPATIENT
Start: 2025-01-21

## 2025-01-29 ENCOUNTER — HOSPITAL ENCOUNTER (OUTPATIENT)
Dept: ULTRASOUND IMAGING | Facility: HOSPITAL | Age: 60
Discharge: HOME | End: 2025-01-29
Admitting: FAMILY MEDICINE
Payer: MEDICARE

## 2025-01-29 DIAGNOSIS — N18.30 STAGE 3 CHRONIC KIDNEY DISEASE, UNSPECIFIED WHETHER STAGE 3A OR 3B CKD: ICD-10-CM

## 2025-01-29 PROCEDURE — 76775 US EXAM ABDO BACK WALL LIM: CPT

## 2025-01-31 DIAGNOSIS — E78.00 PURE HYPERCHOLESTEROLEMIA: ICD-10-CM

## 2025-01-31 DIAGNOSIS — I10 PRIMARY HYPERTENSION: ICD-10-CM

## 2025-01-31 DIAGNOSIS — N18.30 STAGE 3 CHRONIC KIDNEY DISEASE, UNSPECIFIED WHETHER STAGE 3A OR 3B CKD: Primary | ICD-10-CM

## 2025-01-31 DIAGNOSIS — R73.03 PREDIABETES: ICD-10-CM

## 2025-04-21 ENCOUNTER — OFFICE VISIT (OUTPATIENT)
Dept: FAMILY MEDICINE CLINIC | Facility: CLINIC | Age: 60
End: 2025-04-21
Payer: MEDICARE

## 2025-04-21 VITALS
DIASTOLIC BLOOD PRESSURE: 90 MMHG | OXYGEN SATURATION: 95 % | WEIGHT: 263.8 LBS | HEART RATE: 98 BPM | BODY MASS INDEX: 32.8 KG/M2 | TEMPERATURE: 98 F | SYSTOLIC BLOOD PRESSURE: 142 MMHG | HEIGHT: 75 IN

## 2025-04-21 DIAGNOSIS — F10.90 ALCOHOL USE: ICD-10-CM

## 2025-04-21 DIAGNOSIS — I10 PRIMARY HYPERTENSION: Primary | ICD-10-CM

## 2025-04-21 DIAGNOSIS — E78.00 PURE HYPERCHOLESTEROLEMIA: ICD-10-CM

## 2025-04-21 DIAGNOSIS — R73.03 PREDIABETES: ICD-10-CM

## 2025-04-21 DIAGNOSIS — N18.30 STAGE 3 CHRONIC KIDNEY DISEASE, UNSPECIFIED WHETHER STAGE 3A OR 3B CKD: ICD-10-CM

## 2025-04-21 DIAGNOSIS — Z51.81 MEDICATION MONITORING ENCOUNTER: ICD-10-CM

## 2025-04-21 LAB
ALBUMIN SERPL-MCNC: 4.4 G/DL (ref 3.5–5.2)
ALBUMIN/GLOB SERPL: 1.4 G/DL
ALP SERPL-CCNC: 56 U/L (ref 39–117)
ALT SERPL W P-5'-P-CCNC: 54 U/L (ref 1–41)
ANION GAP SERPL CALCULATED.3IONS-SCNC: 8.1 MMOL/L (ref 5–15)
AST SERPL-CCNC: 42 U/L (ref 1–40)
BILIRUB SERPL-MCNC: 0.8 MG/DL (ref 0–1.2)
BUN SERPL-MCNC: 19 MG/DL (ref 6–20)
BUN/CREAT SERPL: 12.4 (ref 7–25)
CALCIUM SPEC-SCNC: 10 MG/DL (ref 8.6–10.5)
CHLORIDE SERPL-SCNC: 102 MMOL/L (ref 98–107)
CHOLEST SERPL-MCNC: 310 MG/DL (ref 0–200)
CO2 SERPL-SCNC: 25.9 MMOL/L (ref 22–29)
CREAT SERPL-MCNC: 1.53 MG/DL (ref 0.76–1.27)
EGFRCR SERPLBLD CKD-EPI 2021: 52.4 ML/MIN/1.73
GLOBULIN UR ELPH-MCNC: 3.1 GM/DL
GLUCOSE SERPL-MCNC: 123 MG/DL (ref 65–99)
HDLC SERPL-MCNC: 61 MG/DL (ref 40–60)
LDLC SERPL CALC-MCNC: 235 MG/DL (ref 0–100)
LDLC/HDLC SERPL: 3.8 {RATIO}
POTASSIUM SERPL-SCNC: 4.6 MMOL/L (ref 3.5–5.2)
PROT SERPL-MCNC: 7.5 G/DL (ref 6–8.5)
SODIUM SERPL-SCNC: 136 MMOL/L (ref 136–145)
TRIGL SERPL-MCNC: 85 MG/DL (ref 0–150)
VLDLC SERPL-MCNC: 14 MG/DL (ref 5–40)

## 2025-04-21 PROCEDURE — 85025 COMPLETE CBC W/AUTO DIFF WBC: CPT | Performed by: FAMILY MEDICINE

## 2025-04-21 PROCEDURE — 36415 COLL VENOUS BLD VENIPUNCTURE: CPT | Performed by: FAMILY MEDICINE

## 2025-04-21 PROCEDURE — 80053 COMPREHEN METABOLIC PANEL: CPT | Performed by: FAMILY MEDICINE

## 2025-04-21 PROCEDURE — 99214 OFFICE O/P EST MOD 30 MIN: CPT | Performed by: FAMILY MEDICINE

## 2025-04-21 PROCEDURE — 83036 HEMOGLOBIN GLYCOSYLATED A1C: CPT | Performed by: FAMILY MEDICINE

## 2025-04-21 PROCEDURE — 80061 LIPID PANEL: CPT | Performed by: FAMILY MEDICINE

## 2025-04-21 RX ORDER — CHLORTHALIDONE 25 MG/1
25 TABLET ORAL DAILY
Qty: 90 TABLET | Refills: 3 | Status: SHIPPED | OUTPATIENT
Start: 2025-04-21

## 2025-04-21 NOTE — PROGRESS NOTES
Chief Complaint  Hypertension    Subjective          Delgado Palma presents to [facility]    History of Present Illness     History of Present Illness  The patient is a 59-year-old male who presents for follow-up of hypertension, chronic kidney disease, and prediabetes.    Blood pressure control at home is reported to be satisfactory, with no recent episodes of severe headaches, which typically occur during periods of significant hypertension. The last episode of severe headache occurred a few months prior, necessitating an emergency room visit where elevated blood pressure was noted. He has exhausted his supply of chlorthalidone and is currently on a regimen of lisinopril 40 mg, amlodipine 10 mg, and chlorthalidone 25 mg.    A kidney ultrasound performed since the last visit showed unremarkable results for the kidneys and bladder, with no stones or lesions. Chronic kidney disease, stage IIIa, is noted, and ongoing monitoring of renal function is planned.    An A1c level of 6.1% was recorded, indicating prediabetes. Efforts to reduce alcohol consumption, including wine, whiskey, and imported [place] beer, are acknowledged, although complete abstinence is not desired.    The last colonoscopy was performed at age 51, yielding normal results. A follow-up colonoscopy is planned around age 61.    SOCIAL HISTORY  The patient admits to drinking wine, liquor, and imported [place] beer.         Current Outpatient Medications   Medication Instructions    albuterol sulfate  (90 Base) MCG/ACT inhaler 2 puffs, Every 4 Hours PRN    amLODIPine (NORVASC) 10 mg, Oral, Daily    atorvastatin (LIPITOR) 10 mg, Oral, Daily    Bempedoic Acid-Ezetimibe (Nexlizet) 180-10 MG tablet 1 tablet, Daily    cetirizine (ZYRTEC) 10 mg, Daily    chlorthalidone (HYGROTON) 25 mg, Oral, Daily    diphenhydrAMINE (BENADRYL) 50 mg, Nightly    doxepin (SINEQUAN) 15 mg, Nightly    DULoxetine (CYMBALTA) 60 mg, Oral, Daily     "fluticasone (FLONASE) 50 MCG/ACT nasal spray 2 sprays, Daily    lisinopril (PRINIVIL,ZESTRIL) 40 mg, Daily    montelukast (SINGULAIR) 10 mg, Nightly    tadalafil (CIALIS) 5 mg, Daily PRN    zolpidem (AMBIEN) 5 mg, Nightly PRN       The following portions of the patient's history were reviewed and updated as appropriate: allergies, current medications, past family history, past medical history, past social history, past surgical history, and problem list.    Objective   Vital Signs:   /90   Pulse 98   Temp 98 °F (36.7 °C) (Oral)   Ht 190.5 cm (75\")   Wt 120 kg (263 lb 12.8 oz)   SpO2 95%   BMI 32.97 kg/m²     BP Readings from Last 3 Encounters:   04/21/25 142/90   01/20/25 134/86   12/31/24 157/97     Wt Readings from Last 3 Encounters:   04/21/25 120 kg (263 lb 12.8 oz)   01/20/25 118 kg (260 lb 4.8 oz)   12/31/24 119 kg (262 lb)           Physical Exam  Vitals reviewed.   Constitutional:       Appearance: Normal appearance.   HENT:      Head: Normocephalic and atraumatic.      Right Ear: External ear normal.      Left Ear: External ear normal.   Eyes:      Conjunctiva/sclera: Conjunctivae normal.   Cardiovascular:      Rate and Rhythm: Normal rate and regular rhythm.      Heart sounds: No murmur heard.     No friction rub. No gallop.   Pulmonary:      Effort: Pulmonary effort is normal.      Breath sounds: Normal breath sounds. No wheezing or rhonchi.   Abdominal:      General: Bowel sounds are normal. There is no distension.      Palpations: Abdomen is soft.      Tenderness: There is no abdominal tenderness.   Skin:     General: Skin is warm and dry.   Neurological:      Mental Status: He is alert and oriented to person, place, and time.      Cranial Nerves: No cranial nerve deficit.   Psychiatric:         Mood and Affect: Mood and affect normal.         Behavior: Behavior normal.         Thought Content: Thought content normal.         Judgment: Judgment normal.            Result Review :   The " following data was reviewed by: Rolando Patricio DO on 04/21/2025:  Common labs          6/24/2024    10:11 1/20/2025    16:01   Common Labs   Glucose 117  100    BUN 12  21    Creatinine 1.55  1.50    Sodium 141  140    Potassium 4.7  4.3    Chloride 102  102    Calcium 9.7  10.0    Albumin 4.4  4.2    Total Bilirubin 0.6  0.7    Alkaline Phosphatase 59  59    AST (SGOT) 27  30    ALT (SGPT) 31  36    WBC 5.85     Hemoglobin 15.0     Hematocrit 46.3     Platelets 253     Total Cholesterol 261  275    Triglycerides 78  89    HDL Cholesterol 60  51    LDL Cholesterol  188  209    Hemoglobin A1C 6.10  6.10    PSA 0.825              Lab Results   Component Value Date    INR 0.95 (L) 08/25/2019       Results  Labs   - A1c: 6.1%    Imaging   - Ultrasound of the kidneys and bladder: Normal appearance with no stones or lesions    Procedures        Assessment and Plan    Diagnoses and all orders for this visit:    1. Primary hypertension (Primary)    2. Medication monitoring encounter    3. Stage 3 chronic kidney disease, unspecified whether stage 3a or 3b CKD    4. Prediabetes    5. Pure hypercholesterolemia    6. Alcohol use    Other orders  -     chlorthalidone (HYGROTON) 25 MG tablet; Take 1 tablet by mouth Daily.  Dispense: 90 tablet; Refill: 3        Assessment & Plan  1. Hypertension.  - Blood pressure readings were slightly elevated during this visit.  - Currently taking lisinopril 40 mg, amlodipine 10 mg, and chlorthalidone 25 mg.  - Prescription for chlorthalidone will be sent to [facility].  - Monitoring blood pressure at home; no recent headaches reported.    2. Chronic kidney disease, stage 3a.  - Renal function is slightly compromised, but dialysis is not required at this stage.  - Ultrasound of the kidneys and bladder was unremarkable.  - Renal function will continue to be monitored, and reassessment will occur at the next follow-up.  - Laboratory tests will be conducted today to monitor kidney  function.    3. Prediabetes.  - A1c level is currently at 6.1%, indicating a prediabetic state.  - Advised to reduce intake of hard liquors.  - A1c level will be rechecked.  - Counseling provided on the impact of alcohol consumption on health.    4. Health maintenance.  - Had a colonoscopy at age 51, which was normal.  - Repeat colonoscopy planned for when he turns 61 in November 2026.  - Discussed the importance of regular screenings and follow-up.  - Encouraged to maintain healthy lifestyle choices.       Medications Discontinued During This Encounter   Medication Reason    chlorthalidone (HYGROTON) 25 MG tablet Reorder          Follow Up   Return in about 6 months (around 10/21/2025) for Hypertension.  Patient was given instructions and counseling regarding his condition or for health maintenance advice. Please see specific information pulled into the AVS if appropriate.     Patient or patient representative verbalized consent for the use of Ambient Listening during the visit with  Rolando Patricio DO for chart documentation. 4/21/2025  15:16 EDT    Rolando Patricio DO  04/21/25  15:30 EDT

## 2025-04-22 ENCOUNTER — RESULTS FOLLOW-UP (OUTPATIENT)
Dept: FAMILY MEDICINE CLINIC | Facility: CLINIC | Age: 60
End: 2025-04-22
Payer: MEDICARE

## 2025-04-22 LAB
BASOPHILS # BLD AUTO: 0.03 10*3/MM3 (ref 0–0.2)
BASOPHILS NFR BLD AUTO: 0.5 % (ref 0–1.5)
DEPRECATED RDW RBC AUTO: 41.8 FL (ref 37–54)
EOSINOPHIL # BLD AUTO: 0.08 10*3/MM3 (ref 0–0.4)
EOSINOPHIL NFR BLD AUTO: 1.2 % (ref 0.3–6.2)
ERYTHROCYTE [DISTWIDTH] IN BLOOD BY AUTOMATED COUNT: 14.7 % (ref 12.3–15.4)
HBA1C MFR BLD: 6.7 % (ref 4.8–5.6)
HCT VFR BLD AUTO: 45.9 % (ref 37.5–51)
HGB BLD-MCNC: 15.2 G/DL (ref 13–17.7)
IMM GRANULOCYTES # BLD AUTO: 0.02 10*3/MM3 (ref 0–0.05)
IMM GRANULOCYTES NFR BLD AUTO: 0.3 % (ref 0–0.5)
LYMPHOCYTES # BLD AUTO: 1.85 10*3/MM3 (ref 0.7–3.1)
LYMPHOCYTES NFR BLD AUTO: 28.3 % (ref 19.6–45.3)
MCH RBC QN AUTO: 26.4 PG (ref 26.6–33)
MCHC RBC AUTO-ENTMCNC: 33.1 G/DL (ref 31.5–35.7)
MCV RBC AUTO: 79.7 FL (ref 79–97)
MONOCYTES # BLD AUTO: 0.57 10*3/MM3 (ref 0.1–0.9)
MONOCYTES NFR BLD AUTO: 8.7 % (ref 5–12)
NEUTROPHILS NFR BLD AUTO: 3.99 10*3/MM3 (ref 1.7–7)
NEUTROPHILS NFR BLD AUTO: 61 % (ref 42.7–76)
NRBC BLD AUTO-RTO: 0 /100 WBC (ref 0–0.2)
PLATELET # BLD AUTO: 248 10*3/MM3 (ref 140–450)
PMV BLD AUTO: 9.7 FL (ref 6–12)
RBC # BLD AUTO: 5.76 10*6/MM3 (ref 4.14–5.8)
WBC NRBC COR # BLD AUTO: 6.54 10*3/MM3 (ref 3.4–10.8)

## 2025-04-22 NOTE — LETTER
Delgado Palma  [address] [place] [address]    April 25, 2025     Dear Mr. Palma:    Below are the results from your recent visit:    Resulted Orders   Comprehensive Metabolic Panel   Result Value Ref Range    Glucose 123 (H) 65 - 99 mg/dL    BUN 19 6 - 20 mg/dL    Creatinine 1.53 (H) 0.76 - 1.27 mg/dL    Sodium 136 136 - 145 mmol/L    Potassium 4.6 3.5 - 5.2 mmol/L    Chloride 102 98 - 107 mmol/L    CO2 25.9 22.0 - 29.0 mmol/L    Calcium 10.0 8.6 - 10.5 mg/dL    Total Protein 7.5 6.0 - 8.5 g/dL    Albumin 4.4 3.5 - 5.2 g/dL    ALT (SGPT) 54 (H) 1 - 41 U/L    AST (SGOT) 42 (H) 1 - 40 U/L    Alkaline Phosphatase 56 39 - 117 U/L    Total Bilirubin 0.8 0.0 - 1.2 mg/dL    Globulin 3.1 gm/dL    A/G Ratio 1.4 g/dL    BUN/Creatinine Ratio 12.4 7.0 - 25.0    Anion Gap 8.1 5.0 - 15.0 mmol/L    eGFR 52.4 (L) >60.0 mL/min/1.73   Hemoglobin A1c   Result Value Ref Range    Hemoglobin A1C 6.70 (H) 4.80 - 5.60 %   Lipid Panel   Result Value Ref Range    Total Cholesterol 310 (H) 0 - 200 mg/dL    Triglycerides 85 0 - 150 mg/dL    HDL Cholesterol 61 (H) 40 - 60 mg/dL    LDL Cholesterol  235 (H) 0 - 100 mg/dL    VLDL Cholesterol 14 5 - 40 mg/dL    LDL/HDL Ratio 3.80    CBC Auto Differential   Result Value Ref Range    WBC 6.54 3.40 - 10.80 10*3/mm3    RBC 5.76 4.14 - 5.80 10*6/mm3    Hemoglobin 15.2 13.0 - 17.7 g/dL    Hematocrit 45.9 37.5 - 51.0 %    MCV 79.7 79.0 - 97.0 fL    MCH 26.4 (L) 26.6 - 33.0 pg    MCHC 33.1 31.5 - 35.7 g/dL    RDW 14.7 12.3 - 15.4 %    RDW-SD 41.8 37.0 - 54.0 fl    MPV 9.7 6.0 - 12.0 fL    Platelets 248 140 - 450 10*3/mm3    Neutrophil % 61.0 42.7 - 76.0 %    Lymphocyte % 28.3 19.6 - 45.3 %    Monocyte % 8.7 5.0 - 12.0 %    Eosinophil % 1.2 0.3 - 6.2 %    Basophil % 0.5 0.0 - 1.5 %    Immature Grans % 0.3 0.0 - 0.5 %    Neutrophils, Absolute 3.99 1.70 - 7.00 10*3/mm3    Lymphocytes, Absolute 1.85 0.70 - 3.10 10*3/mm3    Monocytes, Absolute 0.57 0.10 - 0.90 10*3/mm3    Eosinophils, Absolute 0.08 0.00 -  0.40 10*3/mm3    Basophils, Absolute 0.03 0.00 - 0.20 10*3/mm3    Immature Grans, Absolute 0.02 0.00 - 0.05 10*3/mm3    nRBC 0.0 0.0 - 0.2 /100 WBC     Please let patient know that his blood counts look good with no anemia. A1c is 6.7% which has increased. Please encourage him to cut back on carbohydrates/sugars. Kidney function is stable. Liver enzymes slightly elevated. Plan to monitor at this time. LDL cholesterol is elevated at 253. Please check and see if he is taking his atorvastatin 10 mg daily. If so I recommend increasing the dose. Please let me know.   If you have any questions or concerns, please don't hesitate to call.         Sincerely,        Rolando Patricio, DO